# Patient Record
Sex: FEMALE | Race: WHITE
[De-identification: names, ages, dates, MRNs, and addresses within clinical notes are randomized per-mention and may not be internally consistent; named-entity substitution may affect disease eponyms.]

---

## 2018-01-02 ENCOUNTER — FORM ENCOUNTER (OUTPATIENT)
Age: 73
End: 2018-01-02

## 2019-04-30 ENCOUNTER — FORM ENCOUNTER (OUTPATIENT)
Age: 74
End: 2019-04-30

## 2019-11-11 ENCOUNTER — FORM ENCOUNTER (OUTPATIENT)
Age: 74
End: 2019-11-11

## 2020-08-04 ENCOUNTER — FORM ENCOUNTER (OUTPATIENT)
Age: 75
End: 2020-08-04

## 2020-08-12 ENCOUNTER — FORM ENCOUNTER (OUTPATIENT)
Age: 75
End: 2020-08-12

## 2020-11-24 ENCOUNTER — FORM ENCOUNTER (OUTPATIENT)
Age: 75
End: 2020-11-24

## 2021-02-11 PROBLEM — Z00.00 ENCOUNTER FOR PREVENTIVE HEALTH EXAMINATION: Status: ACTIVE | Noted: 2021-02-11

## 2021-02-17 ENCOUNTER — APPOINTMENT (OUTPATIENT)
Dept: BREAST CENTER | Facility: CLINIC | Age: 76
End: 2021-02-17
Payer: MEDICARE

## 2021-02-17 ENCOUNTER — APPOINTMENT (OUTPATIENT)
Dept: BREAST CENTER | Facility: CLINIC | Age: 76
End: 2021-02-17

## 2021-02-17 VITALS
BODY MASS INDEX: 18.77 KG/M2 | HEART RATE: 62 BPM | DIASTOLIC BLOOD PRESSURE: 73 MMHG | WEIGHT: 102 LBS | HEIGHT: 62 IN | SYSTOLIC BLOOD PRESSURE: 138 MMHG

## 2021-02-17 DIAGNOSIS — Z86.39 PERSONAL HISTORY OF OTHER ENDOCRINE, NUTRITIONAL AND METABOLIC DISEASE: ICD-10-CM

## 2021-02-17 DIAGNOSIS — Z86.79 PERSONAL HISTORY OF OTHER DISEASES OF THE CIRCULATORY SYSTEM: ICD-10-CM

## 2021-02-17 DIAGNOSIS — Z87.39 PERSONAL HISTORY OF OTHER DISEASES OF THE MUSCULOSKELETAL SYSTEM AND CONNECTIVE TISSUE: ICD-10-CM

## 2021-02-17 PROCEDURE — 99214 OFFICE O/P EST MOD 30 MIN: CPT

## 2021-02-17 RX ORDER — DOCUSATE SODIUM 100 MG/1
CAPSULE ORAL
Refills: 0 | Status: ACTIVE | COMMUNITY

## 2021-02-17 RX ORDER — ATORVASTATIN CALCIUM 80 MG/1
TABLET, FILM COATED ORAL
Refills: 0 | Status: ACTIVE | COMMUNITY

## 2021-02-17 RX ORDER — ESTRADIOL 0.1 MG/G
0.1 CREAM VAGINAL
Refills: 0 | Status: ACTIVE | COMMUNITY

## 2021-02-21 ENCOUNTER — TRANSCRIPTION ENCOUNTER (OUTPATIENT)
Age: 76
End: 2021-02-21

## 2021-03-04 ENCOUNTER — RESULT REVIEW (OUTPATIENT)
Age: 76
End: 2021-03-04

## 2021-03-10 ENCOUNTER — NON-APPOINTMENT (OUTPATIENT)
Age: 76
End: 2021-03-10

## 2021-08-12 ENCOUNTER — APPOINTMENT (OUTPATIENT)
Dept: BREAST CENTER | Facility: CLINIC | Age: 76
End: 2021-08-12
Payer: MEDICARE

## 2021-08-12 VITALS — OXYGEN SATURATION: 99 % | DIASTOLIC BLOOD PRESSURE: 69 MMHG | SYSTOLIC BLOOD PRESSURE: 144 MMHG | HEART RATE: 55 BPM

## 2021-08-12 VITALS — BODY MASS INDEX: 21.58 KG/M2 | WEIGHT: 118 LBS

## 2021-08-12 DIAGNOSIS — Z12.39 ENCOUNTER FOR OTHER SCREENING FOR MALIGNANT NEOPLASM OF BREAST: ICD-10-CM

## 2021-08-12 PROCEDURE — 99213 OFFICE O/P EST LOW 20 MIN: CPT

## 2021-08-12 RX ORDER — APIXABAN 5 MG/1
TABLET, FILM COATED ORAL
Refills: 0 | Status: ACTIVE | COMMUNITY

## 2022-03-14 ENCOUNTER — NON-APPOINTMENT (OUTPATIENT)
Age: 77
End: 2022-03-14

## 2022-03-14 ENCOUNTER — APPOINTMENT (OUTPATIENT)
Dept: BREAST CENTER | Facility: CLINIC | Age: 77
End: 2022-03-14
Payer: MEDICARE

## 2022-03-14 VITALS
WEIGHT: 115 LBS | HEART RATE: 65 BPM | HEIGHT: 62 IN | DIASTOLIC BLOOD PRESSURE: 77 MMHG | BODY MASS INDEX: 21.16 KG/M2 | SYSTOLIC BLOOD PRESSURE: 148 MMHG

## 2022-03-14 DIAGNOSIS — Z78.9 OTHER SPECIFIED HEALTH STATUS: ICD-10-CM

## 2022-03-14 DIAGNOSIS — R92.8 OTHER ABNORMAL AND INCONCLUSIVE FINDINGS ON DIAGNOSTIC IMAGING OF BREAST: ICD-10-CM

## 2022-03-14 PROCEDURE — 99213 OFFICE O/P EST LOW 20 MIN: CPT

## 2022-03-14 RX ORDER — LISINOPRIL 2.5 MG/1
2.5 TABLET ORAL
Refills: 0 | Status: ACTIVE | COMMUNITY

## 2022-03-14 RX ORDER — AMLODIPINE BESYLATE 5 MG/1
TABLET ORAL
Refills: 0 | Status: COMPLETED | COMMUNITY
End: 2022-03-14

## 2022-12-01 ENCOUNTER — APPOINTMENT (OUTPATIENT)
Dept: BREAST CENTER | Facility: CLINIC | Age: 77
End: 2022-12-01

## 2022-12-01 ENCOUNTER — NON-APPOINTMENT (OUTPATIENT)
Age: 77
End: 2022-12-01

## 2022-12-01 VITALS
WEIGHT: 116 LBS | HEART RATE: 58 BPM | SYSTOLIC BLOOD PRESSURE: 135 MMHG | DIASTOLIC BLOOD PRESSURE: 78 MMHG | BODY MASS INDEX: 21.35 KG/M2 | HEIGHT: 62 IN

## 2022-12-01 DIAGNOSIS — R92.1 MAMMOGRAPHIC CALCIFICATION FOUND ON DIAGNOSTIC IMAGING OF BREAST: ICD-10-CM

## 2022-12-01 DIAGNOSIS — Z78.9 OTHER SPECIFIED HEALTH STATUS: ICD-10-CM

## 2022-12-01 PROCEDURE — 99213 OFFICE O/P EST LOW 20 MIN: CPT

## 2022-12-01 NOTE — PHYSICAL EXAM
[de-identified] : Bilateral Breast/Axilla/Supraclavicular Area: no masses, discharge, or adenopathy

## 2022-12-01 NOTE — HISTORY OF PRESENT ILLNESS
[FreeTextEntry1] : Patient is a 77 year old F here for breast cancer screening. Hx of L breast infection November 2020 tx with Cipro. She has fhx of breast cancer in both paternal and maternal cousins and a paternal aunt. Denies palpable masses, skin changes, or nipple discharge bilaterally.  \par \par 1/3/18: B/l MG- heterogeneously dense, RODNEY. BIRADS 1. \par 5/1/19: B/l MG- heterogeneously dense, R – pliable RA tissue. BIRADS 2.\par 11/12/19: B/l US- RODNEY. BIRADS 1.\par 8/5/20: B/l MG & US- heterogeneously dense, L – microcalcs 12:00 and central breast slightly lateral to the nipple, dystrophic appearing calcs assoc. w/ an asymmetry 3:00 which may represent degenerating fibroadenoma or fat necrosis – 6mo f/u rec, two tiny groups of microcalcs which demonstrate punctate round morphology – 6mo f/u rec, US – 0.8cm dilated duct w/ low-level echoes intraductal filling defect RA – US bx rec. BIRADS 4 – L US bx rec and 6mo f/u L DXMG rec\par 8/13/20: L US bx: “ribbon shaped clip” duct ectasia w/ intraluminal accumulation of dense focally calcified secretions, periductal inflammatory infiltrate, and dense stromal fibrosis – concordant. 6mo f/u rec\par 2/17/21: L MG & US- uspicious area of calcs rec stereo bx, US- wnl, BIRADS 4\par 3/4/21 L stereo bx of upper central calcs path- benign breast tissue w/ calcs, cork clip, concordant \par 8/12/21: B/L MG & US- Dense. L upper central clip bx proven benign w/ surrounding focal asymmetry. L RA clip.  US- L 0.8cm 12:00, 4FN hypoechoic area, likely c/w MG findings. BIRADS 3. (Rec. L MG & US in 6 mo.)\par 3/14/22: L MG & US- heterogenously dense. MG- unchanged. L 0.6cm hypoechoic findings, likely bx site change, 12:00 4FN (rec f/u at time of annual MG). BI-RADS 3\par 8/15/22: B/L MG & US- heterogeneously dense. L stable focal asymmetry w/ bx clip. US- L 0.7cm stable bx mass 12:00 4FN. BI-RADS 2

## 2023-08-09 ENCOUNTER — APPOINTMENT (OUTPATIENT)
Dept: BREAST CENTER | Facility: CLINIC | Age: 78
End: 2023-08-09
Payer: MEDICARE

## 2023-08-09 ENCOUNTER — NON-APPOINTMENT (OUTPATIENT)
Age: 78
End: 2023-08-09

## 2023-08-09 VITALS
WEIGHT: 113 LBS | HEIGHT: 62 IN | DIASTOLIC BLOOD PRESSURE: 79 MMHG | BODY MASS INDEX: 20.8 KG/M2 | SYSTOLIC BLOOD PRESSURE: 145 MMHG | HEART RATE: 58 BPM

## 2023-08-09 DIAGNOSIS — N64.9 DISORDER OF BREAST, UNSPECIFIED: ICD-10-CM

## 2023-08-09 DIAGNOSIS — Z78.9 OTHER SPECIFIED HEALTH STATUS: ICD-10-CM

## 2023-08-09 DIAGNOSIS — Z80.3 FAMILY HISTORY OF MALIGNANT NEOPLASM OF BREAST: ICD-10-CM

## 2023-08-09 PROCEDURE — 99213 OFFICE O/P EST LOW 20 MIN: CPT

## 2023-08-09 NOTE — PHYSICAL EXAM
[de-identified] : Bilateral Breast/Axilla/Supraclavicular Area: no masses, discharge, or adenopathy

## 2024-08-15 PROBLEM — R92.30 DENSE BREASTS: Status: ACTIVE | Noted: 2024-08-15

## 2024-08-22 ENCOUNTER — APPOINTMENT (OUTPATIENT)
Dept: BREAST CENTER | Facility: CLINIC | Age: 79
End: 2024-08-22
Payer: MEDICARE

## 2024-08-22 VITALS
DIASTOLIC BLOOD PRESSURE: 79 MMHG | HEART RATE: 55 BPM | BODY MASS INDEX: 20.61 KG/M2 | WEIGHT: 112 LBS | HEIGHT: 62 IN | SYSTOLIC BLOOD PRESSURE: 144 MMHG

## 2024-08-22 DIAGNOSIS — R92.30 DENSE BREASTS, UNSPECIFIED: ICD-10-CM

## 2024-08-22 DIAGNOSIS — N64.9 DISORDER OF BREAST, UNSPECIFIED: ICD-10-CM

## 2024-08-22 DIAGNOSIS — Z12.39 ENCOUNTER FOR OTHER SCREENING FOR MALIGNANT NEOPLASM OF BREAST: ICD-10-CM

## 2024-08-22 DIAGNOSIS — R92.1 MAMMOGRAPHIC CALCIFICATION FOUND ON DIAGNOSTIC IMAGING OF BREAST: ICD-10-CM

## 2024-08-22 PROCEDURE — 99213 OFFICE O/P EST LOW 20 MIN: CPT

## 2024-08-22 NOTE — PHYSICAL EXAM
[Normocephalic] : normocephalic [EOMI] : extra ocular movement intact [Supple] : supple [No Supraclavicular Adenopathy] : no supraclavicular adenopathy [No Cervical Adenopathy] : no cervical adenopathy [de-identified] : Bilateral Breast/Axilla/Supraclavicular Area: no masses, discharge, or adenopathy

## 2024-08-22 NOTE — PHYSICAL EXAM
[Normocephalic] : normocephalic [EOMI] : extra ocular movement intact [Supple] : supple [No Supraclavicular Adenopathy] : no supraclavicular adenopathy [No Cervical Adenopathy] : no cervical adenopathy [de-identified] : Bilateral Breast/Axilla/Supraclavicular Area: no masses, discharge, or adenopathy

## 2024-08-22 NOTE — PAST MEDICAL HISTORY
[Menarche Age ____] : age at menarche was [unfilled] [Menopause Age____] : age at menopause was [unfilled] [History of Hormone Replacement Treatment] : has a history of hormone replacement treatment [Total Preg ___] : G[unfilled] [Live Births ___] : P[unfilled]  [Age At Live Birth ___] : Age at live birth: [unfilled] [FreeTextEntry6] : no [FreeTextEntry7] : no [FreeTextEntry8] : no

## 2024-08-22 NOTE — HISTORY OF PRESENT ILLNESS
[FreeTextEntry1] : Patient is a 78 year old F here for breast cancer screening. Hx of L breast infection November 2020 tx with Cipro. She has fhx of breast cancer in both paternal and maternal cousins and a paternal aunt. Denies palpable masses, skin changes, or nipple discharge bilaterally.    1/3/18: B/l MG- heterogeneously dense, RDONEY. BIRADS 1.  5/1/19: B/l MG- heterogeneously dense, R AA?A- pliable RA tissue. BIRADS 2. 11/12/19: B/l US- RODNEY. BIRADS 1. 8/5/20: B/l MG & US- heterogeneously dense, L AA?A- microcalcs 12:00 and central breast slightly lateral to the nipple, dystrophic appearing calcs assoc. w/ an asymmetry 3:00 which may represent degenerating fibroadenoma or fat necrosis AA?A- 6mo f/u rec, two tiny groups of microcalcs which demonstrate punctate round morphology AA?A- 6mo f/u rec, US AA?A- 0.8cm dilated duct w/ low-level echoes intraductal filling defect RA AA?A- US bx rec. BIRADS 4 AA?A- L US bx rec and 6mo f/u L DXMG rec 8/13/20: L US bx (ribbon clip)- duct ectasia w/ intraluminal accumulation of dense focally calcified secretions, periductal inflammatory infiltrate, and dense stromal fibrosis- concordant. 6mo f/u rec 2/17/21: L MG & US- suspicious area of calcs rec stereo bx, US- wnl, BIRADS 4 3/4/21 L stereo bx of upper central calcs path- benign breast tissue w/ calcs, cork clip, concordant  8/12/21: B/L MG & US- Dense. L upper central clip bx proven benign w/ surrounding focal asymmetry. L RA clip.  US- L 0.8cm 12:00, 4FN hypoechoic area, likely c/w MG findings. BIRADS 3. (Rec. L MG & US in 6 mo.) 3/14/22: L MG & US- heterogenously dense. MG- unchanged. L 0.6cm hypoechoic findings, likely bx site change, 12:00 4FN (rec f/u at time of annual MG). BI-RADS 3 8/15/22: B/L MG & US- heterogeneously dense. L stable asymmetry w/ bx clip. US- L 0.7cm stable bx mass 12:00 4FN. BI-RADS 2 8/9/23: B/l MG & US- heterogeneously dense. L stable bx clips x2. US- L bx clip w/o residual mass. BI-RADS 2 8/22/24: B/l MG/US: MG-heterogeneously dense.  L stable retroareolar and upper outer tissue clips.  US-L 12:00 4 FN site of prior benign biopsy with clip.  BI-RADS 2.

## 2024-08-22 NOTE — HISTORY OF PRESENT ILLNESS
[FreeTextEntry1] : Patient is a 78 year old F here for breast cancer screening. Hx of L breast infection November 2020 tx with Cipro. She has fhx of breast cancer in both paternal and maternal cousins and a paternal aunt. Denies palpable masses, skin changes, or nipple discharge bilaterally.    1/3/18: B/l MG- heterogeneously dense, RODNEY. BIRADS 1.  5/1/19: B/l MG- heterogeneously dense, R AA?A- pliable RA tissue. BIRADS 2. 11/12/19: B/l US- RODNEY. BIRADS 1. 8/5/20: B/l MG & US- heterogeneously dense, L AA?A- microcalcs 12:00 and central breast slightly lateral to the nipple, dystrophic appearing calcs assoc. w/ an asymmetry 3:00 which may represent degenerating fibroadenoma or fat necrosis AA?A- 6mo f/u rec, two tiny groups of microcalcs which demonstrate punctate round morphology AA?A- 6mo f/u rec, US AA?A- 0.8cm dilated duct w/ low-level echoes intraductal filling defect RA AA?A- US bx rec. BIRADS 4 AA?A- L US bx rec and 6mo f/u L DXMG rec 8/13/20: L US bx (ribbon clip)- duct ectasia w/ intraluminal accumulation of dense focally calcified secretions, periductal inflammatory infiltrate, and dense stromal fibrosis- concordant. 6mo f/u rec 2/17/21: L MG & US- suspicious area of calcs rec stereo bx, US- wnl, BIRADS 4 3/4/21 L stereo bx of upper central calcs path- benign breast tissue w/ calcs, cork clip, concordant  8/12/21: B/L MG & US- Dense. L upper central clip bx proven benign w/ surrounding focal asymmetry. L RA clip.  US- L 0.8cm 12:00, 4FN hypoechoic area, likely c/w MG findings. BIRADS 3. (Rec. L MG & US in 6 mo.) 3/14/22: L MG & US- heterogenously dense. MG- unchanged. L 0.6cm hypoechoic findings, likely bx site change, 12:00 4FN (rec f/u at time of annual MG). BI-RADS 3 8/15/22: B/L MG & US- heterogeneously dense. L stable asymmetry w/ bx clip. US- L 0.7cm stable bx mass 12:00 4FN. BI-RADS 2 8/9/23: B/l MG & US- heterogeneously dense. L stable bx clips x2. US- L bx clip w/o residual mass. BI-RADS 2 8/22/24: B/l MG/US: MG-heterogeneously dense.  L stable retroareolar and upper outer tissue clips.  US-L 12:00 4 FN site of prior benign biopsy with clip.  BI-RADS 2.

## 2024-08-22 NOTE — PHYSICAL EXAM
[Normocephalic] : normocephalic [EOMI] : extra ocular movement intact [Supple] : supple [No Supraclavicular Adenopathy] : no supraclavicular adenopathy [No Cervical Adenopathy] : no cervical adenopathy [de-identified] : Bilateral Breast/Axilla/Supraclavicular Area: no masses, discharge, or adenopathy

## 2024-09-09 NOTE — HISTORY OF PRESENT ILLNESS
[FreeTextEntry1] : Patient is a 77 year old F here for breast cancer screening. Hx of L breast infection November 2020 tx with Cipro. She has fhx of breast cancer in both paternal and maternal cousins and a paternal aunt. Denies palpable masses, skin changes, or nipple discharge bilaterally.    1/3/18: B/l MG- heterogeneously dense, RODNEY. BIRADS 1.  5/1/19: B/l MG- heterogeneously dense, R AÂ?A- pliable RA tissue. BIRADS 2. 11/12/19: B/l US- RODNEY. BIRADS 1. 8/5/20: B/l MG & US- heterogeneously dense, L AÂ?A- microcalcs 12:00 and central breast slightly lateral to the nipple, dystrophic appearing calcs assoc. w/ an asymmetry 3:00 which may represent degenerating fibroadenoma or fat necrosis AÂ?A- 6mo f/u rec, two tiny groups of microcalcs which demonstrate punctate round morphology AÂ?A- 6mo f/u rec, US AÂ?A- 0.8cm dilated duct w/ low-level echoes intraductal filling defect RA AÂ?A- US bx rec. BIRADS 4 AÂ?A- L US bx rec and 6mo f/u L DXMG rec 8/13/20: L US bx (ribbon clip)- duct ectasia w/ intraluminal accumulation of dense focally calcified secretions, periductal inflammatory infiltrate, and dense stromal fibrosis- concordant. 6mo f/u rec 2/17/21: L MG & US- uspicious area of calcs rec stereo bx, US- wnl, BIRADS 4 3/4/21 L stereo bx of upper central calcs path- benign breast tissue w/ calcs, cork clip, concordant  8/12/21: B/L MG & US- Dense. L upper central clip bx proven benign w/ surrounding focal asymmetry. L RA clip.  US- L 0.8cm 12:00, 4FN hypoechoic area, likely c/w MG findings. BIRADS 3. (Rec. L MG & US in 6 mo.) 3/14/22: L MG & US- heterogenously dense. MG- unchanged. L 0.6cm hypoechoic findings, likely bx site change, 12:00 4FN (rec f/u at time of annual MG). BI-RADS 3 8/15/22: B/L MG & US- heterogeneously dense. L stable asymmetry w/ bx clip. US- L 0.7cm stable bx mass 12:00 4FN. BI-RADS 2 8/9/23: B/l MG & US- heterogeneously dense. L stable bx clips x2. US- L bx clip w/o residual mass. BI-RADS 2 2

## 2025-08-27 ENCOUNTER — APPOINTMENT (OUTPATIENT)
Dept: BREAST CENTER | Facility: CLINIC | Age: 80
End: 2025-08-27
Payer: MEDICARE

## 2025-08-27 ENCOUNTER — NON-APPOINTMENT (OUTPATIENT)
Age: 80
End: 2025-08-27

## 2025-08-27 VITALS
SYSTOLIC BLOOD PRESSURE: 132 MMHG | HEIGHT: 62 IN | DIASTOLIC BLOOD PRESSURE: 73 MMHG | WEIGHT: 112 LBS | HEART RATE: 65 BPM | BODY MASS INDEX: 20.61 KG/M2

## 2025-08-27 DIAGNOSIS — Z12.39 ENCOUNTER FOR OTHER SCREENING FOR MALIGNANT NEOPLASM OF BREAST: ICD-10-CM

## 2025-08-27 DIAGNOSIS — N64.9 DISORDER OF BREAST, UNSPECIFIED: ICD-10-CM

## 2025-08-27 DIAGNOSIS — R92.8 OTHER ABNORMAL AND INCONCLUSIVE FINDINGS ON DIAGNOSTIC IMAGING OF BREAST: ICD-10-CM

## 2025-08-27 DIAGNOSIS — R92.1 MAMMOGRAPHIC CALCIFICATION FOUND ON DIAGNOSTIC IMAGING OF BREAST: ICD-10-CM

## 2025-08-27 PROCEDURE — 99213 OFFICE O/P EST LOW 20 MIN: CPT

## 2025-08-27 RX ORDER — ROSUVASTATIN CALCIUM 5 MG/1
TABLET, FILM COATED ORAL
Refills: 0 | Status: ACTIVE | COMMUNITY

## 2025-08-27 RX ORDER — EZETIMIBE 10 MG/1
TABLET ORAL
Refills: 0 | Status: ACTIVE | COMMUNITY